# Patient Record
Sex: FEMALE | Race: WHITE | NOT HISPANIC OR LATINO | Employment: FULL TIME | URBAN - METROPOLITAN AREA
[De-identification: names, ages, dates, MRNs, and addresses within clinical notes are randomized per-mention and may not be internally consistent; named-entity substitution may affect disease eponyms.]

---

## 2017-04-11 ENCOUNTER — GYNECOLOGY VISIT (OUTPATIENT)
Dept: OBGYN | Facility: MEDICAL CENTER | Age: 27
End: 2017-04-11
Payer: COMMERCIAL

## 2017-04-11 VITALS
DIASTOLIC BLOOD PRESSURE: 70 MMHG | HEIGHT: 65 IN | WEIGHT: 149 LBS | SYSTOLIC BLOOD PRESSURE: 98 MMHG | BODY MASS INDEX: 24.83 KG/M2

## 2017-04-11 DIAGNOSIS — Z12.4 ROUTINE CERVICAL SMEAR: ICD-10-CM

## 2017-04-11 DIAGNOSIS — Z11.3 SCREENING EXAMINATION FOR VENEREAL DISEASE: ICD-10-CM

## 2017-04-11 DIAGNOSIS — Z01.419 WELL WOMAN EXAM: ICD-10-CM

## 2017-04-11 PROCEDURE — 99395 PREV VISIT EST AGE 18-39: CPT | Performed by: OBSTETRICS & GYNECOLOGY

## 2017-04-11 NOTE — PROGRESS NOTES
"Lakisha Long is a 27 y.o. y.o. female who presents for her Gynecologic Exam        HPI Comments: Pt presents for well woman exam. Pt has no complaints and does not desire refill of OCP. Patient's last menstrual period was 03/20/2017.  .  Review of Systems   Pertinent positives documented in HPI and all other systems reviewed & are negative    All PMH, PSH, allergies, social history and FH reviewed and updated today:  Past Medical History   Diagnosis Date   • Back injury      stained back and pulled muscles 1999, 2006   • Bronchitis 2007   • Pneumonia      History reviewed. No pertinent past surgical history.  Review of patient's allergies indicates no known allergies.  Social History     Social History   • Marital Status: Single     Spouse Name: N/A   • Number of Children: N/A   • Years of Education: N/A     Social History Main Topics   • Smoking status: Never Smoker    • Smokeless tobacco: None   • Alcohol Use: Yes      Comment: socially   • Drug Use: No   • Sexual Activity:     Partners: Male     Other Topics Concern   • None     Social History Narrative     History reviewed. No pertinent family history.  Medications:   Current Outpatient Prescriptions Ordered in [facility]   Medication Sig Dispense Refill   • norethindrone-ethinyl estradiol (MICROGESTIN FE 1/20) 1-20 MG-MCG per tablet Take 1 Tab by mouth every day. 28 Tab 12   • hydrocodone-acetaminophen (NORCO) 5-325 MG Tab per tablet Take 1-2 Tabs by mouth every four hours as needed. 10 Tab 0     No current Epic-ordered facility-administered medications on file.          Objective:   Vital measurements:  Blood pressure 98/70, height 1.651 m (5' 5\"), weight 67.586 kg (149 lb), last menstrual period 03/20/2017.  Body mass index is 24.79 kg/(m^2). (Goal BM I>18 <25)    Physical Exam   Nursing note and vitals reviewed.  Constitutional: She is oriented to person, place, and time. She appears well-developed and well-nourished. No distress.     HEENT:   Head: " Normocephalic and atraumatic.   Right Ear: External ear normal.   Left Ear: External ear normal.   Nose: Nose normal.   Eyes: Conjunctivae and EOM are normal. Pupils are equal, round, and reactive to light. No scleral icterus.     Neck: Normal range of motion. Neck supple. No tracheal deviation present. No thyromegaly present.     Pulmonary/Chest: Effort normal and breath sounds normal. No respiratory distress. She has no wheezes. She has no rales. She exhibits no tenderness.     Cardiovascular: Regular, rate and rhythm. No JVD.    Abdominal: Soft. Bowel sounds are normal. She exhibits no distension and no mass. No tenderness. She has no rebound and no guarding.     Breast:  Symmetrical, normal consistency without masses., No dimpling or skin changes, Normal nipples without discharge, no axillary lymphadenopathy, negative    Genitourinary:  Pelvic exam was performed with patient supine.  External genitalia with no abnormal pigmentation, labial fusion,rash, tenderness, lesion or injury to the labia bilaterally.  Vagina is moist with no lesions, foul discharge, erythema, tenderness or bleeding. No foreign body around the vagina or signs of injury.   Cervix exhibits no motion tenderness, no discharge and no friability.   Uterus is RV not deviated, not enlarged, not fixed and not tender.  Right adnexum displays no mass, no tenderness and no fullness. Left adnexum displays no mass, no tenderness and no fullness.     Musculoskeletal: Normal range of motion. She exhibits no edema and no tenderness.     Lymphadenopathy: She has no cervical adenopathy.     Neurological: She is alert and oriented to person, place, and time. She exhibits normal muscle tone.     Skin: Skin is warm and dry. No rash noted. She is not diaphoretic. No erythema. No pallor.     Psychiatric: She has a normal mood and affect. Her behavior is normal. Judgment and thought content normal.               Assessment:     1. Well woman exam  THINPREP RFLX HPV  ASCUS W/CTNG   2. Routine cervical smear  THINPREP RFLX HPV ASCUS W/CTNG   3. Screening examination for venereal disease  THINPREP RFLX HPV ASCUS W/CTNG         Plan:   Pap and physical exam performed  Monthly SBE.  Counseling: breast self exam, STD prevention, HIV risk factors and prevention and family planning choices  Encourage exercise and proper diet.  Mammograms starting @ age 40 annually.  See medications and orders placed in encounter report.

## 2017-04-11 NOTE — MR AVS SNAPSHOT
"        Lakisha Long   2017 1:00 PM   Gynecology Visit   MRN: [id number]    Department:  [facility]   Dept Phone:  [phone]    Description:  Female : 1990   Provider:  Puja Minor M.D.           Reason for Visit     Gynecologic Exam annual exam       Allergies as of 2017     No Known Allergies      You were diagnosed with     Well woman exam   [108155]       Routine cervical smear   [[id number]]       Screening examination for venereal disease   [V74.5.ICD-9-CM]         Vital Signs     Blood Pressure Height Weight Body Mass Index Last Menstrual Period Smoking Status    98/70 mmHg 1.651 m (5' 5\") 67.586 kg (149 lb) 24.79 kg/m2 2017 Never Smoker       Basic Information     Date Of Birth Sex Race Ethnicity Preferred Language    1990 Female White Non- English      Health Maintenance        Date Due Completion Dates    IMM HEP B VACCINE (1 of 3 - Primary Series) 1990 ---    IMM HEP A VACCINE (1 of 2 - Standard Series) 1991 ---    IMM VARICELLA (CHICKENPOX) VACCINE (1 of 2 - 2 Dose Adolescent Series) 2003 ---    IMM DTaP/Tdap/Td Vaccine (1 - Tdap) 2009 ---    PAP SMEAR 2020 (Done), 2016 (Done), 2015, 5/15/2014, 2012    Override on 2017: Done    Override on 2016: Done (quest result)            Current Immunizations     No immunizations on file.      Below and/or attached are the medications your provider expects you to take. Review all of your home medications and newly ordered medications with your provider and/or pharmacist. Follow medication instructions as directed by your provider and/or pharmacist. Please keep your medication list with you and share with your provider. Update the information when medications are discontinued, doses are changed, or new medications (including over-the-counter products) are added; and carry medication information at all times in the event of emergency situations     Allergies: "  No Known Allergies          Medications  Valid as of: April 11, 2017 -  1:29 PM    Generic Name Brand Name Tablet Size Instructions for use    Hydrocodone-Acetaminophen (Tab) NORCO 5-325 MG Take 1-2 Tabs by mouth every four hours as needed.        Norethin Ace-Eth Estrad-FE (Tab) JUNEL FE 1/20 1-20 MG-MCG Take 1 Tab by mouth every day.        .                 Medicines prescribed today were sent to:     [facility] [address] [place] [address] [id number] [facility] [address]    Phone: [phone] Fax: [phone]    Open 24 Hours?: No      Medication refill instructions:       If your prescription bottle indicates you have medication refills left, it is not necessary to call your provider’s office. Please contact your pharmacy and they will refill your medication.    If your prescription bottle indicates you do not have any refills left, you may request refills at any time through one of the following ways: The online [employer] system (except Urgent Care), by calling your provider’s office, or by asking your pharmacy to contact your provider’s office with a refill request. Medication refills are processed only during regular business hours and may not be available until the next business day. Your provider may request additional information or to have a follow-up visit with you prior to refilling your medication.   *Please Note: Medication refills are assigned a new Rx number when refilled electronically. Your pharmacy may indicate that no refills were authorized even though a new prescription for the same medication is available at the pharmacy. Please request the medicine by name with the pharmacy before contacting your provider for a refill.        Your To Do List     Future Labs/Procedures Complete By Expires    THINPREP RFLX HPV ASCUS W/CTNG  As directed 4/11/2018      Instructions    Call for any problems          [employer] Access Code: Activation code not generated  Current  MyChart Status: Active

## 2017-05-08 ENCOUNTER — TELEPHONE (OUTPATIENT)
Dept: OBGYN | Facility: MEDICAL CENTER | Age: 27
End: 2017-05-08

## 2017-05-08 DIAGNOSIS — R87.612 LGSIL ON PAP SMEAR OF CERVIX: ICD-10-CM

## 2017-05-08 NOTE — TELEPHONE ENCOUNTER
REFERRAL PLACED TODAY FOR COLOP-LGSIL ON PAP SEE MEDIA.     CALLED AND LEFT MESSAGE FOR PT TO CALL BACK.

## 2017-05-08 NOTE — TELEPHONE ENCOUNTER
PT CALLED BACK AND INFORMED HER ABOUT PAP RESULTS AND NEED FOR COLPOSCOPY. PT IS SCHEDULED FOR 5/11/17. REFERRAL PLACED TODAY.

## 2017-06-02 ENCOUNTER — GYNECOLOGY VISIT (OUTPATIENT)
Dept: OBGYN | Facility: MEDICAL CENTER | Age: 27
End: 2017-06-02
Payer: COMMERCIAL

## 2017-06-02 VITALS
DIASTOLIC BLOOD PRESSURE: 70 MMHG | WEIGHT: 145 LBS | BODY MASS INDEX: 24.16 KG/M2 | HEIGHT: 65 IN | SYSTOLIC BLOOD PRESSURE: 100 MMHG

## 2017-06-02 DIAGNOSIS — R87.611 PAP SMEAR OF CERVIX WITH ASCUS, CANNOT EXCLUDE HGSIL: ICD-10-CM

## 2017-06-02 LAB
INT CON NEG: NEGATIVE
INT CON POS: POSITIVE
POC URINE PREGNANCY TEST: NEGATIVE

## 2017-06-02 PROCEDURE — 81025 URINE PREGNANCY TEST: CPT | Performed by: OBSTETRICS & GYNECOLOGY

## 2017-06-02 PROCEDURE — 57454 BX/CURETT OF CERVIX W/SCOPE: CPT | Performed by: OBSTETRICS & GYNECOLOGY

## 2017-06-02 NOTE — MR AVS SNAPSHOT
"        Lakisha Long   2017 8:45 AM   Gynecology Visit   MRN: [id number]    Department:  [facility]   Dept Phone:  [phone]    Description:  Female : 1990   Provider:  Puaj Minor M.D.           Reason for Visit     Procedure Colposcopy       Allergies as of 2017     No Known Allergies      You were diagnosed with     Pap smear of cervix with ASCUS, cannot exclude HGSIL   [[id number]]         Vital Signs     Blood Pressure Height Weight Body Mass Index Last Menstrual Period Smoking Status    100/70 mmHg 1.651 m (5' 5\") 65.772 kg (145 lb) 24.13 kg/m2 2017 Never Smoker       Basic Information     Date Of Birth Sex Race Ethnicity Preferred Language    1990 Female White Non- English      Health Maintenance        Date Due Completion Dates    IMM HEP B VACCINE (1 of 3 - Primary Series) 1990 ---    IMM HEP A VACCINE (1 of 2 - Standard Series) 1991 ---    IMM VARICELLA (CHICKENPOX) VACCINE (1 of 2 - 2 Dose Adolescent Series) 2003 ---    IMM DTaP/Tdap/Td Vaccine (1 - Tdap) 2009 ---    PAP SMEAR 2020, 2017 (Done), 2016 (Done), 2015, 5/15/2014, 2012    Override on 2017: Done    Override on 2016: Done (quest result)            Current Immunizations     No immunizations on file.      Below and/or attached are the medications your provider expects you to take. Review all of your home medications and newly ordered medications with your provider and/or pharmacist. Follow medication instructions as directed by your provider and/or pharmacist. Please keep your medication list with you and share with your provider. Update the information when medications are discontinued, doses are changed, or new medications (including over-the-counter products) are added; and carry medication information at all times in the event of emergency situations     Allergies:  No Known Allergies          Medications  Valid as of: 2017 " -  9:21 AM    Generic Name Brand Name Tablet Size Instructions for use    Hydrocodone-Acetaminophen (Tab) NORCO 5-325 MG Take 1-2 Tabs by mouth every four hours as needed.        Norethin Ace-Eth Estrad-FE (Tab) JUNEL FE 1/20 1-20 MG-MCG Take 1 Tab by mouth every day.        .                 Medicines prescribed today were sent to:     [facility] [address] [place] [address] [address] [facility]    [address] [facility] [place] [address]    Phone: [phone] Fax: [phone]    Open 24 Hours?: No      Medication refill instructions:       If your prescription bottle indicates you have medication refills left, it is not necessary to call your provider’s office. Please contact your pharmacy and they will refill your medication.    If your prescription bottle indicates you do not have any refills left, you may request refills at any time through one of the following ways: The online [employer] system (except Urgent Care), by calling your provider’s office, or by asking your pharmacy to contact your provider’s office with a refill request. Medication refills are processed only during regular business hours and may not be available until the next business day. Your provider may request additional information or to have a follow-up visit with you prior to refilling your medication.   *Please Note: Medication refills are assigned a new Rx number when refilled electronically. Your pharmacy may indicate that no refills were authorized even though a new prescription for the same medication is available at the pharmacy. Please request the medicine by name with the pharmacy before contacting your provider for a refill.           [employer] Access Code: Activation code not generated  Current [employer] Status: Active

## 2017-06-02 NOTE — PROGRESS NOTES
Colposcopy    Indication:  ASCUS cannot R/O high grade lesion    Prior to beginning the procedure, risk and benefits of a colposcopy with possibility of biopsies were discussed including the risk of infection, bleeding, and pain. Patient understands the risks associated with the procedure, questions have been answered and consents have been signed to the chart.    Procedure:    Speculum is placed and cervix is visualized. The cervix is cleansed with dilute acetic acid solution.     Satisfactory: YES   Squamo-columnar junction seen: YES   Entire lesion seen: YES   Biopsies done: YES   Biopsy site:  12  ECC: YES    Impression:    JUNIOR 2      Recommendations:  Await path results. If high grade or positive ECC and needs LEEP/Cone, will schedule under general anesthesia for patient comfort      See colpo sheet for further info.

## 2017-06-02 NOTE — Clinical Note
COLPOSCOPY / LEEP DATA SHEET    Lakisha Long     1990      27 y.o.      No LMP recorded.     @EDC@       Medical history:   o MVP    o Blood dyscrasia    o Rh     o Other: .    STD history:    o HPV     o HSV     o HIV     o GC     o Chlamydia     o None     o Other: .    HIV:   o Positive     o Negative                            IV Drug User:   o  Yes    o  No .    Age of first coitus:                                                Number of sex partners in lifetime:  .    Reason for exam:.    Prior abnormal PAPS?    o  Yes  o  No        Treatment: .    Prior history of condyloma?    o  Yes  o  No        Treatment:.     Colposcopic view - description:                                 Satisfactory?    o  Yes  o  No                    Squamo-columnar junction seen?  o  Yes  o  No.    Entire lesion seen?     o  Yes  o  No          PAP done?  o  Yes  o  No           Biopsies done?  o  Yes  o  No.    Biopsy sites:.    ECC done?    o  Yes  o  No      If no, reason:.    Impression:.    Recommendations:.             Colposcopist: ______________________________________________ Date: ___________________

## 2017-06-15 ENCOUNTER — TELEPHONE (OUTPATIENT)
Dept: OBGYN | Facility: MEDICAL CENTER | Age: 27
End: 2017-06-15

## 2017-06-15 DIAGNOSIS — N87.1 DYSPLASIA OF CERVIX, HIGH GRADE CIN 2: ICD-10-CM

## 2017-06-15 NOTE — TELEPHONE ENCOUNTER
Pt called asked to speak to Dr. Minor personally. I was instructed by Dr. Minor that she attempted to contacted the patient unfortunately cannot get a hold of her, is to have patient to make an appt to discuss results and medical plan. Patient refused to make an appointment, insisted to talk to Dr. Minor personally.  At the time when patient called Dr. Minor was in the room seeing a patient. I gave the patient options to hold on the line or I can have Dr. Minor to call her back.  Patient states to call her back and abruptly states to make sure we have her correct phone number. I confirmed her telephone number and all the numbers were correctly matched in our system.

## 2017-08-17 ENCOUNTER — GYNECOLOGY VISIT (OUTPATIENT)
Dept: OBGYN | Facility: MEDICAL CENTER | Age: 27
End: 2017-08-17
Payer: COMMERCIAL

## 2017-08-17 VITALS
BODY MASS INDEX: 24.66 KG/M2 | HEIGHT: 65 IN | SYSTOLIC BLOOD PRESSURE: 100 MMHG | WEIGHT: 148 LBS | DIASTOLIC BLOOD PRESSURE: 72 MMHG

## 2017-08-17 DIAGNOSIS — R87.611 PAP SMEAR OF CERVIX WITH ASCUS, CANNOT EXCLUDE HGSIL: ICD-10-CM

## 2017-08-17 PROCEDURE — 99214 OFFICE O/P EST MOD 30 MIN: CPT | Performed by: OBSTETRICS & GYNECOLOGY

## 2017-08-17 NOTE — MR AVS SNAPSHOT
"        Lakisha Long   2017 1:15 PM   Gynecology Visit   MRN: [id number]    Department:  [facility]   Dept Phone:  [phone]    Description:  Female : 1990   Provider:  Puja Minor M.D.           Reason for Visit     Other pap smear      Allergies as of 2017     No Known Allergies      You were diagnosed with     Pap smear of cervix with ASCUS, cannot exclude HGSIL   [[id number]]         Vital Signs     Blood Pressure Height Weight Body Mass Index Last Menstrual Period Smoking Status    100/72 mmHg 1.651 m (5' 5\") 67.132 kg (148 lb) 24.63 kg/m2 2017 Never Smoker       Basic Information     Date Of Birth Sex Race Ethnicity Preferred Language    1990 Female White Non- English      Health Maintenance        Date Due Completion Dates    IMM HEP B VACCINE (1 of 3 - Primary Series) 1990 ---    IMM HEP A VACCINE (1 of 2 - Standard Series) 1991 ---    IMM VARICELLA (CHICKENPOX) VACCINE (1 of 2 - 2 Dose Adolescent Series) 2003 ---    IMM DTaP/Tdap/Td Vaccine (1 - Tdap) 2009 ---    IMM INFLUENZA (1) 2017 ---    PAP SMEAR 2020, 2017 (Done), 2016 (Done), 2015, 5/15/2014, 2012    Override on 2017: Done    Override on 2016: Done (quest result)            Current Immunizations     No immunizations on file.      Below and/or attached are the medications your provider expects you to take. Review all of your home medications and newly ordered medications with your provider and/or pharmacist. Follow medication instructions as directed by your provider and/or pharmacist. Please keep your medication list with you and share with your provider. Update the information when medications are discontinued, doses are changed, or new medications (including over-the-counter products) are added; and carry medication information at all times in the event of emergency situations     Allergies:  No Known Allergies          "   Medications  Valid as of: August 17, 2017 -  1:43 PM    Generic Name Brand Name Tablet Size Instructions for use    Hydrocodone-Acetaminophen (Tab) NORCO 5-325 MG Take 1-2 Tabs by mouth every four hours as needed.        Norethin Ace-Eth Estrad-FE (Tab) JUNEL FE 1/20 1-20 MG-MCG Take 1 Tab by mouth every day.        .                 Medicines prescribed today were sent to:     [facility] [address] [place] [address] [address] [facility] [place] [address]    Phone: [phone] Fax: [phone]    Open 24 Hours?: No      Medication refill instructions:       If your prescription bottle indicates you have medication refills left, it is not necessary to call your provider’s office. Please contact your pharmacy and they will refill your medication.    If your prescription bottle indicates you do not have any refills left, you may request refills at any time through one of the following ways: The online [employer] system (except Urgent Care), by calling your provider’s office, or by asking your pharmacy to contact your provider’s office with a refill request. Medication refills are processed only during regular business hours and may not be available until the next business day. Your provider may request additional information or to have a follow-up visit with you prior to refilling your medication.   *Please Note: Medication refills are assigned a new Rx number when refilled electronically. Your pharmacy may indicate that no refills were authorized even though a new prescription for the same medication is available at the pharmacy. Please request the medicine by name with the pharmacy before contacting your provider for a refill.        Your To Do List     Future Labs/Procedures Complete By Expires    THINPREP PAP,REFLEX HPV ON ASC-US ONLY  As directed 8/17/2018         [employer] Access Code: Activation code not generated  Current [employer] Status: Active

## 2017-08-17 NOTE — PROGRESS NOTES
"Chief Complaint   Patient presents with   • Other     pap smear       History of present illness: 27 y.o. presents with above chief complaint. Pt has ASCUS cannot rule out HG on last pap. She had a colposcopy performed and biopsy consistent with JUNIOR 2. Pt opted at that time to undergo \"Ozone\" therapy for her dysplasia and is now returning for a repeat pap. States the procedure was pain free. She is not having any problems today. No bleeding, discharge or other abnormalities.    Review of systems:  Pertinent positives documented in HPI and a comprehensive review of system is negative as follows:    Constitutional ROS: No unexpected change in weight, No weakness, No fatigue, No unexplained fevers, sweats, or chills  Mouth/Throat ROS: No bleeding gums, No sore throat, No recent change in voice or hoarseness  Neck ROS: No lumps or masses, No swollen glands, No significant pain in neck  Pulmonary ROS: No chronic cough, sputum, or hemoptysis, No dyspnea on exertion, No wheezing, No shortness of breath, No recent change in breathing  Cardiovascular ROS: No exercise intolerance, No chest pain, No shortness of breath, No palpitations, No syncope  Genitourinary ROS: Negative for dysuria, frequency and incontinence  Gastrointestinal ROS: No abdominal pain, No change in bowel habits, No significant change in appetite, No nausea, vomiting, diarrhea, or constipation, No hematemesis, No abdominal bloating or early satiety  Breast ROS: No new breast lumps or masses, No severe breast pain, No nipple discharge  Musculoskeletal/Extremities ROS: No cyanosis, No peripheral edema, No pain, redness or swelling on the joints  Hematologic/Lymphatic ROS: No anemia, No abnormal bleeding, No chills, No bruising, No weight loss  Skin/Integumentary ROS: No evidence of bleeding or bruising, No abnormal skin lesions noted, No evidence of rash, No itching  Neurologic ROS: No chronic headaches, No seizures, No weakness  Psychiatric ROS: No " "depression, No anxiety, No psychosis    All PMH, PSH, allergies, social history and FH reviewed and updated today:  Past Medical History   Diagnosis Date   • Back injury      stained back and pulled muscles 1999, 2006   • Bronchitis 2007   • Pneumonia        History reviewed. No pertinent past surgical history.    Allergies: No Known Allergies    Social History     Social History   • Marital Status: Single     Spouse Name: N/A   • Number of Children: N/A   • Years of Education: N/A     Occupational History   • Not on file.     Social History Main Topics   • Smoking status: Never Smoker    • Smokeless tobacco: Not on file   • Alcohol Use: Yes      Comment: socially   • Drug Use: No   • Sexual Activity:     Partners: Male     Other Topics Concern   • Not on file     Social History Narrative       History reviewed. No pertinent family history.    Physical exam:  Blood pressure 100/72, height 1.651 m (5' 5\"), weight 67.132 kg (148 lb), last menstrual period 07/25/2017.    GENERAL APPEARANCE: healthy, alert, no distress, cooperative  NECK nontender, no masses, thyromegaly or nodules  ABDOMEN Abdomen soft, non-tender. BS normal. No masses,  No organomegaly  FEMALE GYN: normal female external genitalia without lesions, grey vaginal discharge noted, vulva pink without erythema or friability, urethra is normal without discharge or scarring, no bladder fullness or masses, normal vagina and normal vaginal tone, normal cervix, normal  uterus, size and consistency, normal adnexa without tenderness, normal anus and perineum.  No inguinal hernia present.  EXTREMITIES:negative clubbing, cyanosis, edema    NEURO Awake, alert and oriented x 3, Normal gait, no sensory deficits  SKIN No rashes, or ulcers or lesions seen  PSYCHIATRIC: Patient shows appropriate affect, is alert and oriented x3, intact judgment and insight.        1. Pap smear of cervix with ASCUS, cannot exclude HGSIL  THINPREP PAP,REFLEX HPV ON ASC-US ONLY     Repeat " pap performed  Will call patient with results of pap   Discussed other therapies available if she would desire if pap returns abnormal still or expectant management

## 2017-08-25 ENCOUNTER — TELEPHONE (OUTPATIENT)
Dept: OBGYN | Facility: MEDICAL CENTER | Age: 27
End: 2017-08-25

## 2017-08-25 NOTE — TELEPHONE ENCOUNTER
Call from pt inquiring about pap smear results.  Informed pt that I called for her result yesterday and per [employer] results were not ready and should be ready by Monday. Pt would like a call Monday, informed pt that depending when the report is released that is when she will get a call. Advised pt that  it will mos likely be  that she will be called Tuesday with the results. Pt states that she verbally understands.

## 2017-08-29 ENCOUNTER — TELEPHONE (OUTPATIENT)
Dept: OBGYN | Facility: MEDICAL CENTER | Age: 27
End: 2017-08-29

## 2017-09-12 ENCOUNTER — TELEPHONE (OUTPATIENT)
Dept: OBGYN | Facility: MEDICAL CENTER | Age: 27
End: 2017-09-12

## 2017-09-12 NOTE — TELEPHONE ENCOUNTER
Call from Kiarra at [facility] requesting pap results on pt. States that she has apt today. States that she got the records that were faxed over but state that the HVP is missing. I called [facility] and spoke with Rohit states that order for HPV on 8/21/17 was pended and was not fully completed. State that she has talked with pathology and they will run HVP today. Informed Kiarra of this and states that when results comes in to please fax to [phone].

## 2017-09-18 ENCOUNTER — TELEPHONE (OUTPATIENT)
Dept: OBGYN | Facility: MEDICAL CENTER | Age: 27
End: 2017-09-18

## 2017-09-18 NOTE — TELEPHONE ENCOUNTER
Pt mom Puja wanted to talk to you, she still have questions after your conversation the other day and states she's calling for the doctor too! Dr. Woodall.

## 2017-09-19 ENCOUNTER — TELEPHONE (OUTPATIENT)
Dept: OBGYN | Facility: MEDICAL CENTER | Age: 27
End: 2017-09-19

## 2017-09-19 NOTE — TELEPHONE ENCOUNTER
Late entry  For 9/14/17, spoke with Puja Mother about HPV results and that also results have been faxed over to PCP office which mother also works at. See media for results. All questions answered about HPV and pap and why the HPV has to be done with accordance with  Pap being HGSIL.

## 2017-09-20 NOTE — TELEPHONE ENCOUNTER
Spoke with the mom and explained the test that was added on to her pap of hpv after I consulted with Dr. Gambino mom was satisfied and vásquez no further questions.

## 2018-12-27 ENCOUNTER — TELEPHONE (OUTPATIENT)
Dept: CARDIOLOGY | Facility: MEDICAL CENTER | Age: 28
End: 2018-12-27

## 2018-12-27 NOTE — TELEPHONE ENCOUNTER
M for patient to call back and let us know if she has ever seen a cardiologist before, had an EKG done , or any recent labs. Patient has an upcoming NP appointment with AK 12/31/2018.

## 2018-12-28 NOTE — TELEPHONE ENCOUNTER
Received message from  Ogla for patients upcoming appointment AK. Chart complete.        Nicholas Porter     Ms. Long called back; she said she has never had any prior Cardio; no EKG; or recent labs done either.     Thank you     Olga

## 2018-12-31 ENCOUNTER — OFFICE VISIT (OUTPATIENT)
Dept: CARDIOLOGY | Facility: MEDICAL CENTER | Age: 28
End: 2018-12-31
Payer: COMMERCIAL

## 2018-12-31 VITALS
BODY MASS INDEX: 24.32 KG/M2 | WEIGHT: 146 LBS | SYSTOLIC BLOOD PRESSURE: 118 MMHG | OXYGEN SATURATION: 100 % | HEART RATE: 70 BPM | HEIGHT: 65 IN | DIASTOLIC BLOOD PRESSURE: 72 MMHG

## 2018-12-31 DIAGNOSIS — R55 SYNCOPE AND COLLAPSE: ICD-10-CM

## 2018-12-31 LAB — EKG IMPRESSION: NORMAL

## 2018-12-31 PROCEDURE — 93000 ELECTROCARDIOGRAM COMPLETE: CPT | Performed by: INTERNAL MEDICINE

## 2018-12-31 PROCEDURE — 99204 OFFICE O/P NEW MOD 45 MIN: CPT | Performed by: INTERNAL MEDICINE

## 2018-12-31 NOTE — LETTER
[facility]   [address], [address]  [place] [address]  Phone: [phone]  Fax: [phone]              Lakisha Long  1990    Encounter Date: 12/31/2018    Tobias Palacio M.D.          PROGRESS NOTE:      Cardiology Initial Consultation Note    Date of note:    12/31/2018    Primary Care Provider: Francis Woodall D.O.  Referring Provider: Sudha Zhou M.D.    Patient Name: Lakisha Long   YOB: 1990  MRN:              [id number]    Chief Complaint: Syncope    Lakisha Long is a 28 y.o. female  patient presented today with an episode of syncope.  She was visiting [place] last month, was walking in cold weather, felt dizzy and lightheaded for a few minutes, not particularly heart palpitations, lightheadedness progressively gotten worse, she walked into a book store was trying to sit in a chair but unable to make it had a fall and syncope, lost consciousness for a few seconds and was completely fine after couple of minutes.  No prior similar episodes.      ROS  Pertinent positives are shortness of breath, constipation, seasonal allergies, depression, anxiety, insomnia    All other systems reviewed and discussed using a comprehensive questionnaire and are negative.     Past medical history, family history, social history, allergies and labs are reviewed and updated as needed as documented below.    Past Medical History:   Diagnosis Date   • Back injury     stained back and pulled muscles 1999, 2006   • Bronchitis 2007   • Pneumonia          No past surgical history on file.      Current Outpatient Prescriptions   Medication Sig Dispense Refill   • norethindrone-ethinyl estradiol (MICROGESTIN FE 1/20) 1-20 MG-MCG per tablet Take 1 Tab by mouth every day. (Patient not taking: Reported on 12/31/2018) 28 Tab 12   • hydrocodone-acetaminophen (NORCO) 5-325 MG Tab per tablet Take 1-2 Tabs by mouth every four hours as  "needed. (Patient not taking: Reported on 12/31/2018) 10 Tab 0     No current facility-administered medications for this visit.          No Known Allergies    Family history is positive for heart attack grandfather maternal   hypertension mother.  Paternal grandmother has aortic aneurysm abdominal  Paternal uncle had aortic valve replacement at the age of 56    Social History     Social History   • Marital status: Single     Spouse name: N/A   • Number of children: N/A   • Years of education: N/A     Occupational History   • Not on file.     Social History Main Topics   • Smoking status: Never Smoker   • Smokeless tobacco: Never Used   • Alcohol use Yes      Comment: socially   • Drug use: No   • Sexual activity: Yes     Partners: Male     Other Topics Concern   • Not on file     Social History Narrative   • No narrative on file         Physical Exam:  Ambulatory Vitals  Blood pressure 118/72, pulse 70, height 1.651 m (5' 5\"), weight 66.2 kg (146 lb), SpO2 100 %, not currently breastfeeding.   Oxygen Therapy:  Pulse Oximetry: 100 %  BP Readings from Last 4 Encounters:   12/31/18 118/72   08/17/17 100/72   06/02/17 100/70   04/11/17 (!) 98/70       Weight/BMI: Body mass index is 24.3 kg/m².  Wt Readings from Last 4 Encounters:   12/31/18 66.2 kg (146 lb)   08/17/17 67.1 kg (148 lb)   06/02/17 65.8 kg (145 lb)   04/11/17 67.6 kg (149 lb)         General: Well appearing and in no apparent distress  Head: atrumatic  Eyes: No conjunctival pallor   ENT: normal external appearance of nose and ears  Neck: JVD absent, carotid bruits absent  Lungs: respiratory sounds  normal, additional breath sounds absent  Heart: Regular rhythm,   No palpable thrills on palpation, murmurs absent, no rubs,   Lower extremity edema absent.   Pedal pulses normal  Abdomen: soft, non tender, non distended.  Extremities/MSK: no clubbing, no cyanosis  Neurological: normal orientation, Gait normal   Psychiatric: Appropriate affect, intact judgement " and insight  Skin: Warm extremities      Lab Data Review:  No results found for: CHOLSTRLTOT, LDL, HDL, TRIGLYCERIDE    Lab Results   Component Value Date/Time    SODIUM 137 2013 11:25 PM    POTASSIUM 3.9 2013 11:25 PM    CHLORIDE 105 2013 11:25 PM    CO2 25 2013 11:25 PM    GLUCOSE 89 2013 11:25 PM    BUN 12 2013 11:25 PM    CREATININE 0.91 2013 11:25 PM     Lab Results   Component Value Date/Time    ALKPHOSPHAT 40 2013 11:25 PM    ASTSGOT 26 2013 11:25 PM    ALTSGPT 29 2013 11:25 PM    TBILIRUBIN 0.5 2013 11:25 PM      Lab Results   Component Value Date/Time    WBC 10.3 2013 11:25 PM     No components found for: HBGA1C  No components found for: TROPONIN  No components found for: BNP      Cardiac Imaging and Procedures Review:    EKG dated 2018 : My personal interpretation is sinus bradycardia      Medical Decision Makin.  Syncope    Likely vasovagal by history.  Significant family history of any vascular problems.  Would perform echocardiogram to rule out structural heart disease.  Counseling was provided regarding vasovagal syncope, how to avoid fall.  If she has recurrent symptoms, advised to call us, consider Holter monitoring.    Follow-up as needed.  This note was dictated using Dragon speech recognition software.    Tobias WINTER  Interventional cardiologist  [facility]ld[facility].  [address] [address]  Phone: [phone]  Fax: [phone]                  No Recipients

## 2019-01-01 NOTE — PROGRESS NOTES
Cardiology Initial Consultation Note    Date of note:    12/31/2018    Primary Care Provider: Francis Woodall D.O.  Referring Provider: Sudha Zhou M.D.    Patient Name: Lakisha Long   YOB: 1990  MRN:              [id number]    Chief Complaint: Syncope    Lakisha Long is a 28 y.o. female  patient presented today with an episode of syncope.  She was visiting [place] last month, was walking in cold weather, felt dizzy and lightheaded for a few minutes, not particularly heart palpitations, lightheadedness progressively gotten worse, she walked into a book store was trying to sit in a chair but unable to make it had a fall and syncope, lost consciousness for a few seconds and was completely fine after couple of minutes.  No prior similar episodes.      ROS  Pertinent positives are shortness of breath, constipation, seasonal allergies, depression, anxiety, insomnia    All other systems reviewed and discussed using a comprehensive questionnaire and are negative.     Past medical history, family history, social history, allergies and labs are reviewed and updated as needed as documented below.    Past Medical History:   Diagnosis Date   • Back injury     stained back and pulled muscles 1999, 2006   • Bronchitis 2007   • Pneumonia          No past surgical history on file.      Current Outpatient Prescriptions   Medication Sig Dispense Refill   • norethindrone-ethinyl estradiol (MICROGESTIN FE 1/20) 1-20 MG-MCG per tablet Take 1 Tab by mouth every day. (Patient not taking: Reported on 12/31/2018) 28 Tab 12   • hydrocodone-acetaminophen (NORCO) 5-325 MG Tab per tablet Take 1-2 Tabs by mouth every four hours as needed. (Patient not taking: Reported on 12/31/2018) 10 Tab 0     No current facility-administered medications for this visit.          No Known Allergies    Family history is positive for heart attack grandfather maternal   hypertension mother.  Paternal grandmother has  "aortic aneurysm abdominal  Paternal uncle had aortic valve replacement at the age of 56    Social History     Social History   • Marital status: Single     Spouse name: N/A   • Number of children: N/A   • Years of education: N/A     Occupational History   • Not on file.     Social History Main Topics   • Smoking status: Never Smoker   • Smokeless tobacco: Never Used   • Alcohol use Yes      Comment: socially   • Drug use: No   • Sexual activity: Yes     Partners: Male     Other Topics Concern   • Not on file     Social History Narrative   • No narrative on file         Physical Exam:  Ambulatory Vitals  Blood pressure 118/72, pulse 70, height 1.651 m (5' 5\"), weight 66.2 kg (146 lb), SpO2 100 %, not currently breastfeeding.   Oxygen Therapy:  Pulse Oximetry: 100 %  BP Readings from Last 4 Encounters:   12/31/18 118/72   08/17/17 100/72   06/02/17 100/70   04/11/17 (!) 98/70       Weight/BMI: Body mass index is 24.3 kg/m².  Wt Readings from Last 4 Encounters:   12/31/18 66.2 kg (146 lb)   08/17/17 67.1 kg (148 lb)   06/02/17 65.8 kg (145 lb)   04/11/17 67.6 kg (149 lb)         General: Well appearing and in no apparent distress  Head: atrumatic  Eyes: No conjunctival pallor   ENT: normal external appearance of nose and ears  Neck: JVD absent, carotid bruits absent  Lungs: respiratory sounds  normal, additional breath sounds absent  Heart: Regular rhythm,   No palpable thrills on palpation, murmurs absent, no rubs,   Lower extremity edema absent.   Pedal pulses normal  Abdomen: soft, non tender, non distended.  Extremities/MSK: no clubbing, no cyanosis  Neurological: normal orientation, Gait normal   Psychiatric: Appropriate affect, intact judgement and insight  Skin: Warm extremities      Lab Data Review:  No results found for: CHOLSTRLTOT, LDL, HDL, TRIGLYCERIDE    Lab Results   Component Value Date/Time    SODIUM 137 08/19/2013 11:25 PM    POTASSIUM 3.9 08/19/2013 11:25 PM    CHLORIDE 105 08/19/2013 11:25 PM    CO2 " 25 2013 11:25 PM    GLUCOSE 89 2013 11:25 PM    BUN 12 2013 11:25 PM    CREATININE 0.91 2013 11:25 PM     Lab Results   Component Value Date/Time    ALKPHOSPHAT 40 2013 11:25 PM    ASTSGOT 26 2013 11:25 PM    ALTSGPT 29 2013 11:25 PM    TBILIRUBIN 0.5 2013 11:25 PM      Lab Results   Component Value Date/Time    WBC 10.3 2013 11:25 PM     No components found for: HBGA1C  No components found for: TROPONIN  No components found for: BNP      Cardiac Imaging and Procedures Review:    EKG dated 2018 : My personal interpretation is sinus bradycardia      Medical Decision Makin.  Syncope    Likely vasovagal by history.  Significant family history of any vascular problems.  Would perform echocardiogram to rule out structural heart disease.  Counseling was provided regarding vasovagal syncope, how to avoid fall.  If she has recurrent symptoms, advised to call us, consider Holter monitoring.    Follow-up as needed.  This note was dictated using Dragon speech recognition software.    Tobias WINTER  Interventional cardiologist  [facility]ld[facility]  [address] [address]  Phone: [phone]  Fax: [phone]

## 2019-01-11 ENCOUNTER — APPOINTMENT (OUTPATIENT)
Dept: CARDIOLOGY | Facility: MEDICAL CENTER | Age: 29
End: 2019-01-11
Attending: INTERNAL MEDICINE
Payer: COMMERCIAL

## 2019-03-21 ENCOUNTER — APPOINTMENT (OUTPATIENT)
Dept: RADIOLOGY | Facility: MEDICAL CENTER | Age: 29
End: 2019-03-21
Attending: PHYSICIAN ASSISTANT
Payer: COMMERCIAL

## 2020-12-26 ENCOUNTER — HOSPITAL ENCOUNTER (OUTPATIENT)
Dept: LAB | Facility: MEDICAL CENTER | Age: 30
End: 2020-12-26
Attending: GENERAL PRACTICE
Payer: OTHER GOVERNMENT

## 2020-12-26 LAB — COVID ORDER STATUS COVID19: NORMAL

## 2020-12-26 PROCEDURE — U0003 INFECTIOUS AGENT DETECTION BY NUCLEIC ACID (DNA OR RNA); SEVERE ACUTE RESPIRATORY SYNDROME CORONAVIRUS 2 (SARS-COV-2) (CORONAVIRUS DISEASE [COVID-19]), AMPLIFIED PROBE TECHNIQUE, MAKING USE OF HIGH THROUGHPUT TECHNOLOGIES AS DESCRIBED BY CMS-2020-01-R: HCPCS

## 2020-12-26 PROCEDURE — C9803 HOPD COVID-19 SPEC COLLECT: HCPCS

## 2020-12-27 LAB
SARS-COV-2 RNA RESP QL NAA+PROBE: NOTDETECTED
SPECIMEN SOURCE: NORMAL

## 2021-06-23 ENCOUNTER — OFFICE VISIT (OUTPATIENT)
Dept: URGENT CARE | Facility: CLINIC | Age: 31
End: 2021-06-23
Payer: COMMERCIAL

## 2021-06-23 VITALS
RESPIRATION RATE: 12 BRPM | WEIGHT: 157.6 LBS | SYSTOLIC BLOOD PRESSURE: 110 MMHG | BODY MASS INDEX: 26.26 KG/M2 | HEIGHT: 65 IN | DIASTOLIC BLOOD PRESSURE: 72 MMHG | TEMPERATURE: 97.5 F | OXYGEN SATURATION: 97 % | HEART RATE: 85 BPM

## 2021-06-23 DIAGNOSIS — J01.90 ACUTE NON-RECURRENT SINUSITIS, UNSPECIFIED LOCATION: ICD-10-CM

## 2021-06-23 DIAGNOSIS — J02.9 PHARYNGITIS, UNSPECIFIED ETIOLOGY: ICD-10-CM

## 2021-06-23 LAB
INT CON NEG: NEGATIVE
INT CON POS: POSITIVE
S PYO AG THROAT QL: NEGATIVE

## 2021-06-23 PROCEDURE — 87880 STREP A ASSAY W/OPTIC: CPT | Performed by: PHYSICIAN ASSISTANT

## 2021-06-23 PROCEDURE — 99203 OFFICE O/P NEW LOW 30 MIN: CPT | Performed by: PHYSICIAN ASSISTANT

## 2021-06-23 RX ORDER — CEPHALEXIN 500 MG/1
500 CAPSULE ORAL 2 TIMES DAILY
Qty: 20 CAPSULE | Refills: 0 | Status: SHIPPED | OUTPATIENT
Start: 2021-06-23 | End: 2021-07-03

## 2021-06-23 ASSESSMENT — ENCOUNTER SYMPTOMS
SORE THROAT: 1
PALPITATIONS: 0
DIAPHORESIS: 0
CHILLS: 0
DIARRHEA: 0
NAUSEA: 0
WHEEZING: 0
COUGH: 1
VOMITING: 0
SPUTUM PRODUCTION: 1
SINUS PAIN: 1
SHORTNESS OF BREATH: 0
STRIDOR: 0
DIZZINESS: 0
HEADACHES: 0
MYALGIAS: 0
ABDOMINAL PAIN: 0
FEVER: 0

## 2021-06-23 NOTE — PATIENT INSTRUCTIONS
Pharyngitis    Pharyngitis is redness, pain, and swelling (inflammation) of the throat (pharynx). It is a very common cause of sore throat. Pharyngitis can be caused by a bacteria, but it is usually caused by a virus. Most cases of pharyngitis get better on their own without treatment.  What are the causes?  This condition may be caused by:  · Infection by viruses (viral). Viral pharyngitis spreads from person to person (is contagious) through coughing, sneezing, and sharing of personal items or utensils such as cups, forks, spoons, and toothbrushes.  · Infection by bacteria (bacterial). Bacterial pharyngitis may be spread by touching the nose or face after coming in contact with the bacteria, or through more intimate contact, such as kissing.  · Allergies. Allergies can cause buildup of mucus in the throat (post-nasal drip), leading to inflammation and irritation. Allergies can also cause blocked nasal passages, forcing breathing through the mouth, which dries and irritates the throat.  What increases the risk?  You are more likely to develop this condition if:  · You are 5-24 years old.  · You are exposed to crowded environments such as , school, or dormitory living.  · You live in a cold climate.  · You have a weakened disease-fighting (immune) system.  What are the signs or symptoms?  Symptoms of this condition vary by the cause (viral, bacterial, or allergies) and can include:  · Sore throat.  · Fatigue.  · Low-grade fever.  · Headache.  · Joint pain and muscle aches.  · Skin rashes.  · Swollen glands in the throat (lymph nodes).  · Plaque-like film on the throat or tonsils. This is often a symptom of bacterial pharyngitis.  · Vomiting.  · Stuffy nose (nasal congestion).  · Cough.  · Red, itchy eyes (conjunctivitis).  · Loss of appetite.  How is this diagnosed?  This condition is often diagnosed based on your medical history and a physical exam. Your health care provider will ask you questions about  your illness and your symptoms. A swab of your throat may be done to check for bacteria (rapid strep test). Other lab tests may also be done, depending on the suspected cause, but these are rare.  How is this treated?  This condition usually gets better in 3-4 days without medicine. Bacterial pharyngitis may be treated with antibiotic medicines.  Follow these instructions at home:  · Take over-the-counter and prescription medicines only as told by your health care provider.  ? If you were prescribed an antibiotic medicine, take it as told by your health care provider. Do not stop taking the antibiotic even if you start to feel better.  ? Do not give children aspirin because of the association with Reye syndrome.  · Drink enough water and fluids to keep your urine clear or pale yellow.  · Get a lot of rest.  · Gargle with a salt-water mixture 3-4 times a day or as needed. To make a salt-water mixture, completely dissolve ½-1 tsp of salt in 1 cup of warm water.  · If your health care provider approves, you may use throat lozenges or sprays to soothe your throat.  Contact a health care provider if:  · You have large, tender lumps in your neck.  · You have a rash.  · You cough up green, yellow-brown, or bloody spit.  Get help right away if:  · Your neck becomes stiff.  · You drool or are unable to swallow liquids.  · You cannot drink or take medicines without vomiting.  · You have severe pain that does not go away, even after you take medicine.  · You have trouble breathing, and it is not caused by a stuffy nose.  · You have new pain and swelling in your joints such as the knees, ankles, wrists, or elbows.  Summary  · Pharyngitis is redness, pain, and swelling (inflammation) of the throat (pharynx).  · While pharyngitis can be caused by a bacteria, the most common causes are viral.  · Most cases of pharyngitis get better on their own without treatment.  · Bacterial pharyngitis is treated with antibiotic medicines.  This  information is not intended to replace advice given to you by your health care provider. Make sure you discuss any questions you have with your health care provider.  Document Released: 12/18/2006 Document Revised: 11/30/2018 Document Reviewed: 01/23/2018  Elsevier Patient Education © 2020 [employer]

## 2021-06-23 NOTE — PROGRESS NOTES
Subjective:   Lakisha Long is a 31 y.o. female who presents for Sore Throat (x 3 days with white spot, congestion (blood tinged.)  Denies fever or chill. Hx of Lyme disease.)      HPI:  This is a very pleasant 31-year-old female presenting to the clinic with sore throat, sinus congestion and left-sided ear pain x3 days.  She states her symptoms have progressively worsened.  She has mild pain every time she swallows.  Denies any difficulty swallowing.  Denies any pain with neck movement.  She has a dull throbbing sensation to the left ear.  She has had sinus congestion with clear rhinorrhea.  Occasional cough and on one instance had a small amount of blood-tinged sputum.  Denies any difficulty breathing or shortness of breath.  Denies any chest pain.  Denies any ill contacts.  She has not had any fevers or chills.  She has not tried any OTC medications at this time.    Review of Systems   Constitutional: Positive for malaise/fatigue. Negative for chills, diaphoresis and fever.   HENT: Positive for congestion, ear pain, sinus pain and sore throat.    Respiratory: Positive for cough and sputum production. Negative for shortness of breath, wheezing and stridor.    Cardiovascular: Negative for chest pain and palpitations.   Gastrointestinal: Negative for abdominal pain, diarrhea, nausea and vomiting.   Musculoskeletal: Negative for myalgias.   Neurological: Negative for dizziness and headaches.   Endo/Heme/Allergies: Negative for environmental allergies.       Medications:    • cephALEXin Caps  • HYDROcodone-acetaminophen Tabs  • norethindrone-ethinyl estradiol    Allergies: Patient has no known allergies.    Problem List: Lakisha Long does not have a problem list on file.    Surgical History:  No past surgical history on file.    Past Social Hx: Lakisha Long  reports that she has never smoked. She has never used smokeless tobacco. She reports current alcohol use. She reports that she does not  "use drugs.     Past Family Hx:  Lakisha Long family history is not on file.     Problem list, medications, and allergies reviewed by myself today in Epic.     Objective:     /72   Pulse 85   Temp 36.4 °C (97.5 °F) (Temporal)   Resp 12   Ht 1.651 m (5' 5\")   Wt 71.5 kg (157 lb 9.6 oz)   LMP 06/13/2021   SpO2 97%   BMI 26.23 kg/m²     Physical Exam  Constitutional:       General: She is not in acute distress.     Appearance: Normal appearance. She is not ill-appearing, toxic-appearing or diaphoretic.   HENT:      Head: Normocephalic and atraumatic.      Right Ear: Ear canal and external ear normal.      Left Ear: Ear canal and external ear normal.      Ears:      Comments: Bilateral TMs erythematous with a layer of purulent fluid posteriorly.     Nose: Congestion and rhinorrhea present.      Mouth/Throat:      Mouth: Mucous membranes are moist.      Pharynx: Oropharyngeal exudate and posterior oropharyngeal erythema present.      Comments: Posterior oropharynx erythematous.  Scant tonsillar exudates.  2+ tonsillar edema.  Uvula midline nondeviated.  Eyes:      Conjunctiva/sclera: Conjunctivae normal.   Cardiovascular:      Rate and Rhythm: Normal rate and regular rhythm.      Pulses: Normal pulses.      Heart sounds: Normal heart sounds.   Pulmonary:      Effort: Pulmonary effort is normal.      Breath sounds: Normal breath sounds. No wheezing, rhonchi or rales.   Abdominal:      Palpations: Abdomen is soft.      Tenderness: There is no abdominal tenderness.   Musculoskeletal:      Cervical back: Normal range of motion. No rigidity or tenderness. No muscular tenderness.   Lymphadenopathy:      Cervical: Cervical adenopathy (Left-sided anterior cervical adenopathy) present.   Skin:     General: Skin is warm and dry.      Capillary Refill: Capillary refill takes less than 2 seconds.   Neurological:      Mental Status: She is alert.   Psychiatric:         Mood and Affect: Mood normal.         " Thought Content: Thought content normal.       Rapid strep: Negative    Assessment/Plan:     Comments/MDM:     Nasal spray and allergy medications as directed (Zyrtec or Loratadine).  Antibiotic as directed.  You may try saline irrigation or neti pot.   Drink plenty of fluids.  Ibuprofen or Tylenol as directed for pain.   Warm compress to sinuses.      • Follow up with primary care provider. Urgently for worsening symptoms, persistent fevers, facial swelling, visual changes, weakness, elevated heart rate, stiff neck, symptoms last longer than 10 days, or any other concerns.       Diagnosis and associated orders:     1. Acute non-recurrent sinusitis, unspecified location  cephALEXin (KEFLEX) 500 MG Cap    POCT Rapid Strep A   2. Pharyngitis, unspecified etiology  cephALEXin (KEFLEX) 500 MG Cap    POCT Rapid Strep A              Differential diagnosis, natural history, supportive care, and indications for immediate follow-up discussed.    Advised the patient to follow-up with the primary care physician for recheck, reevaluation, and consideration of further management.    Please note that this dictation was created using voice recognition software. I have made reasonable attempt to correct obvious errors, but I expect that there are errors of grammar and possibly content that I did not discover before finalizing the note.    This note was electronically signed by LLOYD Sanchez PA-C

## 2024-08-18 ENCOUNTER — APPOINTMENT (OUTPATIENT)
Dept: URGENT CARE | Facility: CLINIC | Age: 34
End: 2024-08-18